# Patient Record
Sex: FEMALE | Employment: FULL TIME | URBAN - METROPOLITAN AREA
[De-identification: names, ages, dates, MRNs, and addresses within clinical notes are randomized per-mention and may not be internally consistent; named-entity substitution may affect disease eponyms.]

---

## 2019-11-04 ENCOUNTER — OFFICE VISIT (OUTPATIENT)
Dept: PODIATRY | Facility: CLINIC | Age: 34
End: 2019-11-04
Payer: COMMERCIAL

## 2019-11-04 VITALS
SYSTOLIC BLOOD PRESSURE: 147 MMHG | DIASTOLIC BLOOD PRESSURE: 83 MMHG | HEIGHT: 65 IN | OXYGEN SATURATION: 95 % | BODY MASS INDEX: 58.08 KG/M2 | HEART RATE: 109 BPM

## 2019-11-04 DIAGNOSIS — B07.0 PLANTAR WART: Primary | ICD-10-CM

## 2019-11-04 PROCEDURE — 17110 DESTRUCTION B9 LES UP TO 14: CPT | Performed by: PODIATRIST

## 2019-11-04 RX ORDER — HYDROCHLOROTHIAZIDE 25 MG/1
25 TABLET ORAL DAILY
Refills: 1 | COMMUNITY
Start: 2019-10-01

## 2019-11-04 RX ORDER — POTASSIUM CHLORIDE 1500 MG/1
20 TABLET, EXTENDED RELEASE ORAL 2 TIMES DAILY
COMMUNITY
Start: 2018-12-24 | End: 2019-12-24

## 2019-11-04 ASSESSMENT — PAIN SCALES - GENERAL: PAINLEVEL: MILD PAIN (3)

## 2019-11-04 NOTE — PATIENT INSTRUCTIONS
Thanks for coming today.  [facility]  [address] [place] [address]    To schedule future appointments in Ortho Clinic, you may call [phone].    To schedule ordered imaging by your provider:   Call Central [facility] [phone]    To schedule an injection ordered by your provider:  Call [facility] Injection scheduling line: [phone]  [facility] available online at:  [email or web address]    Please call if any further questions or concerns ([phone]).  Clinic hours 8 am to 5 pm.    Return to clinic (call) if symptoms worsen or fail to improve.

## 2019-11-04 NOTE — LETTER
11/4/2019         RE: Maryanne Finch  [address] [place] [address]        Dear Colleague,    Thank you for referring your patient, Maryanne Finch, to the [facility]. Please see a copy of my visit note below.    Past Medical History:   Diagnosis Date     Anxiety      Asthma      Depression      Eczema      PONV (postoperative nausea and vomiting)      Seasonal allergies      Patient Active Problem List   Diagnosis     CARDIOVASCULAR SCREENING; LDL GOAL LESS THAN 160     IUD (intrauterine device) in place     Asthma, moderate persistent     Morbid obesity (H)     Mild major depression (H)     Generalized anxiety disorder     Hypertension goal BP (blood pressure) < 140/90     Pedal edema     Past Surgical History:   Procedure Laterality Date     C RAD RESEC TONSIL/PILLARS      age 13     Social History     Socioeconomic History     Marital status: Single     Spouse name: Not on file     Number of children: Not on file     Years of education: Not on file     Highest education level: Not on file   Occupational History     Not on file   Social Needs     Financial resource strain: Not on file     Food insecurity:     Worry: Not on file     Inability: Not on file     Transportation needs:     Medical: Not on file     Non-medical: Not on file   Tobacco Use     Smoking status: Former Smoker     Types: Cigarettes     Last attempt to quit: 5/20/2009     Years since quitting: 10.4     Smokeless tobacco: Never Used   Substance and Sexual Activity     Alcohol use: Yes     Alcohol/week: 1.7 - 5.0 standard drinks     Types: 2 - 6 drink(s) per week     Drug use: No     Sexual activity: Not Currently   Lifestyle     Physical activity:     Days per week: Not on file     Minutes per session: Not on file     Stress: Not on file   Relationships     Social connections:     Talks on phone: Not on file     Gets together: Not on file     Attends Islam service: Not on file     Active member of club or  organization: Not on file     Attends meetings of clubs or organizations: Not on file     Relationship status: Not on file     Intimate partner violence:     Fear of current or ex partner: Not on file     Emotionally abused: Not on file     Physically abused: Not on file     Forced sexual activity: Not on file   Other Topics Concern     Parent/sibling w/ CABG, MI or angioplasty before 65F 55M? Not Asked   Social History Narrative     Not on file     Family History   Problem Relation Age of Onset     Hypertension Mother      Lipids Mother      Hypertension Father      Lipids Father      Cancer Maternal Grandmother         liver, skin     Cancer Maternal Grandfather         colon, lung      Cancer Paternal Grandfather         lung     Diabetes Maternal Grandmother      Diabetes Maternal Aunt      Diabetes Maternal Aunt      Cerebrovascular Disease Father      Cardiovascular Mother         atrial fibrillation      SUBJECTIVE FINDINGS:  A 34-year-old female presents for left foot.  She relates in July, she stepped on a belt buckle.  It went in and she pulled it out.  She got the whole buckle out.  There was nothing left in the wound.  She relates it healed up, it did well.  She put alcohol on it and Neosporin on it and healed up until last week, when it started turning again.  She relates no specific injuries then.  No specific relieving or aggravating factors.  She does work on her feet.      OBJECTIVE FINDINGS:  Plantar lateral left foot:  She has hyperkeratotic tissue buildup that is nucleated with pinpoint ecchymosis, pinpoint bleeding upon debridement.  There is no erythema, no drainage, no odor, no calor.  She has some pain there, but she relates it is much better than it was yesterday.      ASSESSMENT AND PLAN:  Plantar wart, left plantar lateral foot.  This is also an area of previous injury.  Rule out foreign body.  Diagnosis and treatment options discussed with the patient.  We are going to hold off on x-ray  today.  The lesion was debrided and frozen with liquid nitrogen upon consent.  Moleskin was dispensed and use discussed with her.  Return to clinic and see me in 2 weeks.  Diagnosis and treatment options discussed with the patient.         Again, thank you for allowing me to participate in the care of your patient.        Sincerely,        Martin Snyder DPM

## 2019-11-04 NOTE — NURSING NOTE
"Maryanne Finch's chief complaint for this visit includes:  Chief Complaint   Patient presents with     WOUND CARE     stepped on a belt buckle 3 months ago - healed and then 1 week ago pain started again     PCP: Margarita Donaldson    Referring Provider:  No referring provider defined for this encounter.    BP (!) 147/83 (BP Location: Left arm, Patient Position: Sitting, Cuff Size: Adult Regular)   Pulse 109   Ht 1.651 m (5' 5\")   SpO2 95%   BMI 58.08 kg/m      Do you need any medication refills at today's visit? No    Nesha Velazquez CMA        "

## 2019-11-04 NOTE — PROGRESS NOTES
Past Medical History:   Diagnosis Date     Anxiety      Asthma      Depression      Eczema      PONV (postoperative nausea and vomiting)      Seasonal allergies      Patient Active Problem List   Diagnosis     CARDIOVASCULAR SCREENING; LDL GOAL LESS THAN 160     IUD (intrauterine device) in place     Asthma, moderate persistent     Morbid obesity (H)     Mild major depression (H)     Generalized anxiety disorder     Hypertension goal BP (blood pressure) < 140/90     Pedal edema     Past Surgical History:   Procedure Laterality Date     C RAD RESEC TONSIL/PILLARS      age 13     Social History     Socioeconomic History     Marital status: Single     Spouse name: Not on file     Number of children: Not on file     Years of education: Not on file     Highest education level: Not on file   Occupational History     Not on file   Social Needs     Financial resource strain: Not on file     Food insecurity:     Worry: Not on file     Inability: Not on file     Transportation needs:     Medical: Not on file     Non-medical: Not on file   Tobacco Use     Smoking status: Former Smoker     Types: Cigarettes     Last attempt to quit: 5/20/2009     Years since quitting: 10.4     Smokeless tobacco: Never Used   Substance and Sexual Activity     Alcohol use: Yes     Alcohol/week: 1.7 - 5.0 standard drinks     Types: 2 - 6 drink(s) per week     Drug use: No     Sexual activity: Not Currently   Lifestyle     Physical activity:     Days per week: Not on file     Minutes per session: Not on file     Stress: Not on file   Relationships     Social connections:     Talks on phone: Not on file     Gets together: Not on file     Attends [employer] service: Not on file     Active member of club or organization: Not on file     Attends meetings of clubs or organizations: Not on file     Relationship status: Not on file     Intimate partner violence:     Fear of current or ex partner: Not on file     Emotionally abused: Not on file      Physically abused: Not on file     Forced sexual activity: Not on file   Other Topics Concern     Parent/sibling w/ CABG, MI or angioplasty before 65F 55M? Not Asked   Social History Narrative     Not on file     Family History   Problem Relation Age of Onset     Hypertension Mother      Lipids Mother      Hypertension Father      Lipids Father      Cancer Maternal Grandmother         liver, skin     Cancer Maternal Grandfather         colon, lung      Cancer Paternal Grandfather         lung     Diabetes Maternal Grandmother      Diabetes Maternal Aunt      Diabetes Maternal Aunt      Cerebrovascular Disease Father      Cardiovascular Mother         atrial fibrillation      SUBJECTIVE FINDINGS:  A 34-year-old female presents for left foot.  She relates in July, she stepped on a belt buckle.  It went in and she pulled it out.  She got the whole buckle out.  There was nothing left in the wound.  She relates it healed up, it did well.  She put alcohol on it and Neosporin on it and healed up until last week, when it started turning again.  She relates no specific injuries then.  No specific relieving or aggravating factors.  She does work on her feet.      OBJECTIVE FINDINGS:  Plantar lateral left foot:  She has hyperkeratotic tissue buildup that is nucleated with pinpoint ecchymosis, pinpoint bleeding upon debridement.  There is no erythema, no drainage, no odor, no calor.  She has some pain there, but she relates it is much better than it was yesterday.      ASSESSMENT AND PLAN:  Plantar wart, left plantar lateral foot.  This is also an area of previous injury.  Rule out foreign body.  Diagnosis and treatment options discussed with the patient.  We are going to hold off on x-ray today.  The lesion was debrided and frozen with liquid nitrogen upon consent.  Moleskin was dispensed and use discussed with her.  Return to clinic and see me in 2 weeks.  Diagnosis and treatment options discussed with the patient.

## 2019-11-18 ENCOUNTER — OFFICE VISIT (OUTPATIENT)
Dept: PODIATRY | Facility: CLINIC | Age: 34
End: 2019-11-18
Payer: COMMERCIAL

## 2019-11-18 VITALS — HEART RATE: 99 BPM | OXYGEN SATURATION: 95 % | SYSTOLIC BLOOD PRESSURE: 129 MMHG | DIASTOLIC BLOOD PRESSURE: 78 MMHG

## 2019-11-18 DIAGNOSIS — B07.0 PLANTAR WART: Primary | ICD-10-CM

## 2019-11-18 PROCEDURE — 17110 DESTRUCTION B9 LES UP TO 14: CPT | Performed by: PODIATRIST

## 2019-11-18 NOTE — NURSING NOTE
Maryanne Finch's chief complaint for this visit includes:  Chief Complaint   Patient presents with     RECHECK     plantar wart left foot / bilateral foot pain     PCP: Margarita Donaldson    Referring Provider:  No referring provider defined for this encounter.    /78 (BP Location: Left arm, Patient Position: Sitting, Cuff Size: Adult Regular)   Pulse 99   SpO2 95%   Data Unavailable     Do you need any medication refills at today's visit? No    Nesha Velazquez CMA

## 2019-11-18 NOTE — PROGRESS NOTES
Past Medical History:   Diagnosis Date     Anxiety      Asthma      Depression      Eczema      PONV (postoperative nausea and vomiting)      Seasonal allergies      Patient Active Problem List   Diagnosis     CARDIOVASCULAR SCREENING; LDL GOAL LESS THAN 160     IUD (intrauterine device) in place     Asthma, moderate persistent     Morbid obesity (H)     Mild major depression (H)     Generalized anxiety disorder     Hypertension goal BP (blood pressure) < 140/90     Pedal edema     Past Surgical History:   Procedure Laterality Date     C RAD RESEC TONSIL/PILLARS      age 13     Social History     Socioeconomic History     Marital status: Single     Spouse name: Not on file     Number of children: Not on file     Years of education: Not on file     Highest education level: Not on file   Occupational History     Not on file   Social Needs     Financial resource strain: Not on file     Food insecurity:     Worry: Not on file     Inability: Not on file     Transportation needs:     Medical: Not on file     Non-medical: Not on file   Tobacco Use     Smoking status: Former Smoker     Types: Cigarettes     Last attempt to quit: 5/20/2009     Years since quitting: 10.5     Smokeless tobacco: Never Used   Substance and Sexual Activity     Alcohol use: Yes     Alcohol/week: 1.7 - 5.0 standard drinks     Types: 2 - 6 drink(s) per week     Drug use: No     Sexual activity: Not Currently   Lifestyle     Physical activity:     Days per week: Not on file     Minutes per session: Not on file     Stress: Not on file   Relationships     Social connections:     Talks on phone: Not on file     Gets together: Not on file     Attends [employer] service: Not on file     Active member of club or organization: Not on file     Attends meetings of clubs or organizations: Not on file     Relationship status: Not on file     Intimate partner violence:     Fear of current or ex partner: Not on file     Emotionally abused: Not on file      Physically abused: Not on file     Forced sexual activity: Not on file   Other Topics Concern     Parent/sibling w/ CABG, MI or angioplasty before 65F 55M? Not Asked   Social History Narrative     Not on file     Family History   Problem Relation Age of Onset     Hypertension Mother      Lipids Mother      Hypertension Father      Lipids Father      Cancer Maternal Grandmother         liver, skin     Cancer Maternal Grandfather         colon, lung      Cancer Paternal Grandfather         lung     Diabetes Maternal Grandmother      Diabetes Maternal Aunt      Diabetes Maternal Aunt      Cerebrovascular Disease Father      Cardiovascular Mother         atrial fibrillation      SUBJECTIVE FINDINGS:  34-year-old female returns to clinic for plantar wart, left plantar lateral foot, rule out foreign body.  She relates it feels good now.  She rates it was sore for several days after we froze it, but then that got better.  No new problems.      OBJECTIVE FINDINGS:  Plantar lateral left foot, she has hyperkeratotic tissue buildup with minimal pinpoint ecchymosis.  No erythema, no drainage, no odor, no calor.      ASSESSMENT AND PLAN:  Plantar wart, left plantar lateral foot.  Rule out foreign body.  This is doing well.  Diagnosis and treatment options discussed with her.  The lesion was debrided and frozen with liquid nitrogen x3.  Moleskin dispensed and applied upon consent.  The lesion was debrided and frozen upon consent as well.  She will return to clinic to see me in 2 weeks.

## 2019-11-18 NOTE — LETTER
11/18/2019         RE: Maryanne Finch  [address] [place] [address]        Dear Colleague,    Thank you for referring your patient, Maryanne Finch, to the [facility]. Please see a copy of my visit note below.    Past Medical History:   Diagnosis Date     Anxiety      Asthma      Depression      Eczema      PONV (postoperative nausea and vomiting)      Seasonal allergies      Patient Active Problem List   Diagnosis     CARDIOVASCULAR SCREENING; LDL GOAL LESS THAN 160     IUD (intrauterine device) in place     Asthma, moderate persistent     Morbid obesity (H)     Mild major depression (H)     Generalized anxiety disorder     Hypertension goal BP (blood pressure) < 140/90     Pedal edema     Past Surgical History:   Procedure Laterality Date     C RAD RESEC TONSIL/PILLARS      age 13     Social History     Socioeconomic History     Marital status: Single     Spouse name: Not on file     Number of children: Not on file     Years of education: Not on file     Highest education level: Not on file   Occupational History     Not on file   Social Needs     Financial resource strain: Not on file     Food insecurity:     Worry: Not on file     Inability: Not on file     Transportation needs:     Medical: Not on file     Non-medical: Not on file   Tobacco Use     Smoking status: Former Smoker     Types: Cigarettes     Last attempt to quit: 5/20/2009     Years since quitting: 10.5     Smokeless tobacco: Never Used   Substance and Sexual Activity     Alcohol use: Yes     Alcohol/week: 1.7 - 5.0 standard drinks     Types: 2 - 6 drink(s) per week     Drug use: No     Sexual activity: Not Currently   Lifestyle     Physical activity:     Days per week: Not on file     Minutes per session: Not on file     Stress: Not on file   Relationships     Social connections:     Talks on phone: Not on file     Gets together: Not on file     Attends [employer] service: Not on file     Active member of club or  organization: Not on file     Attends meetings of clubs or organizations: Not on file     Relationship status: Not on file     Intimate partner violence:     Fear of current or ex partner: Not on file     Emotionally abused: Not on file     Physically abused: Not on file     Forced sexual activity: Not on file   Other Topics Concern     Parent/sibling w/ CABG, MI or angioplasty before 65F 55M? Not Asked   Social History Narrative     Not on file     Family History   Problem Relation Age of Onset     Hypertension Mother      Lipids Mother      Hypertension Father      Lipids Father      Cancer Maternal Grandmother         liver, skin     Cancer Maternal Grandfather         colon, lung      Cancer Paternal Grandfather         lung     Diabetes Maternal Grandmother      Diabetes Maternal Aunt      Diabetes Maternal Aunt      Cerebrovascular Disease Father      Cardiovascular Mother         atrial fibrillation      SUBJECTIVE FINDINGS:  34-year-old female returns to clinic for plantar wart, left plantar lateral foot, rule out foreign body.  She relates it feels good now.  She rates it was sore for several days after we froze it, but then that got better.  No new problems.      OBJECTIVE FINDINGS:  Plantar lateral left foot, she has hyperkeratotic tissue buildup with minimal pinpoint ecchymosis.  No erythema, no drainage, no odor, no calor.      ASSESSMENT AND PLAN:  Plantar wart, left plantar lateral foot.  Rule out foreign body.  This is doing well.  Diagnosis and treatment options discussed with her.  The lesion was debrided and frozen with liquid nitrogen x3.  Moleskin dispensed and applied upon consent.  The lesion was debrided and frozen upon consent as well.  She will return to clinic to see me in 2 weeks.         Again, thank you for allowing me to participate in the care of your patient.        Sincerely,        Martin Snyder DPM

## 2019-11-18 NOTE — PATIENT INSTRUCTIONS
Thanks for coming today.  Ortho[facility] Medicine Clinic  [address] [place] [address]    To schedule future appointments in Ortho Clinic, you may call [phone].    To schedule ordered imaging by your provider:   Call Central Imaging [phone]    To schedule an injection ordered by your provider:  Call [facility] Injection scheduling line: [phone]  [facility] available online at:  [email or web address]    Please call if any further questions or concerns ([phone]).  Clinic hours 8 am to 5 pm.    Return to clinic (call) if symptoms worsen or fail to improve.

## 2019-12-02 ENCOUNTER — OFFICE VISIT (OUTPATIENT)
Dept: PODIATRY | Facility: CLINIC | Age: 34
End: 2019-12-02
Payer: COMMERCIAL

## 2019-12-02 VITALS — OXYGEN SATURATION: 97 % | DIASTOLIC BLOOD PRESSURE: 93 MMHG | SYSTOLIC BLOOD PRESSURE: 159 MMHG | HEART RATE: 108 BPM

## 2019-12-02 DIAGNOSIS — B07.0 PLANTAR WART: Primary | ICD-10-CM

## 2019-12-02 PROCEDURE — 17110 DESTRUCTION B9 LES UP TO 14: CPT | Performed by: PODIATRIST

## 2019-12-02 NOTE — LETTER
12/2/2019         RE: Maryanne Finch  [address] [place] [address]        Dear Colleague,    Thank you for referring your patient, Maryanne Finch, to the [facility]. Please see a copy of my visit note below.    Past Medical History:   Diagnosis Date     Anxiety      Asthma      Depression      Eczema      PONV (postoperative nausea and vomiting)      Seasonal allergies      Patient Active Problem List   Diagnosis     CARDIOVASCULAR SCREENING; LDL GOAL LESS THAN 160     IUD (intrauterine device) in place     Asthma, moderate persistent     Morbid obesity (H)     Mild major depression (H)     Generalized anxiety disorder     Hypertension goal BP (blood pressure) < 140/90     Pedal edema     Past Surgical History:   Procedure Laterality Date     C RAD RESEC TONSIL/PILLARS      age 13     Social History     Socioeconomic History     Marital status: Single     Spouse name: Not on file     Number of children: Not on file     Years of education: Not on file     Highest education level: Not on file   Occupational History     Not on file   Social Needs     Financial resource strain: Not on file     Food insecurity:     Worry: Not on file     Inability: Not on file     Transportation needs:     Medical: Not on file     Non-medical: Not on file   Tobacco Use     Smoking status: Former Smoker     Types: Cigarettes     Last attempt to quit: 5/20/2009     Years since quitting: 10.5     Smokeless tobacco: Never Used   Substance and Sexual Activity     Alcohol use: Yes     Alcohol/week: 1.7 - 5.0 standard drinks     Types: 2 - 6 drink(s) per week     Drug use: No     Sexual activity: Not Currently   Lifestyle     Physical activity:     Days per week: Not on file     Minutes per session: Not on file     Stress: Not on file   Relationships     Social connections:     Talks on phone: Not on file     Gets together: Not on file     Attends [employer] service: Not on file     Active member of club or  organization: Not on file     Attends meetings of clubs or organizations: Not on file     Relationship status: Not on file     Intimate partner violence:     Fear of current or ex partner: Not on file     Emotionally abused: Not on file     Physically abused: Not on file     Forced sexual activity: Not on file   Other Topics Concern     Parent/sibling w/ CABG, MI or angioplasty before 65F 55M? Not Asked   Social History Narrative     Not on file     Family History   Problem Relation Age of Onset     Hypertension Mother      Lipids Mother      Hypertension Father      Lipids Father      Cancer Maternal Grandmother         liver, skin     Cancer Maternal Grandfather         colon, lung      Cancer Paternal Grandfather         lung     Diabetes Maternal Grandmother      Diabetes Maternal Aunt      Diabetes Maternal Aunt      Cerebrovascular Disease Father      Cardiovascular Mother         atrial fibrillation      SUBJECTIVE FINDINGS:  A 34-year-old female returns to clinic for plantar wart, left plantar lateral foot.  She relates it is doing okay.  It does not hurt.  No new problems.      OBJECTIVE FINDINGS:  Left plantar lateral foot:  She has a hyperkeratotic tissue lesion with some pinpoint ecchymosis that is flattened out.  No erythema, no drainage, no odor, no calor.  Some hyperkeratotic tissue buildup.      ASSESSMENT AND PLAN:  Plantar wart, left plantar lateral foot.  This is improved.  Also rule out foreign body.  Diagnosis and treatment options discussed with the patient.  The lesion was debrided and frozen with liquid nitrogen x3 upon consent.  Moleskin was applied and dispensed upon consent.  She will return to clinic and see me in 2 weeks.  This is improved.         Again, thank you for allowing me to participate in the care of your patient.        Sincerely,        Martin Snyder DPM

## 2019-12-02 NOTE — NURSING NOTE
Maryanne Finch's chief complaint for this visit includes:  Chief Complaint   Patient presents with     RECHECK     plantar wart left foot     PCP: Margarita Donaldson    Referring Provider:  No referring provider defined for this encounter.    BP (!) 159/93 (BP Location: Left arm, Patient Position: Sitting, Cuff Size: Adult Regular)   Pulse 108   SpO2 97%   Data Unavailable     Do you need any medication refills at today's visit? No    Nesha Velazquez CMA

## 2019-12-02 NOTE — PATIENT INSTRUCTIONS
Thanks for coming today.  Ortho[facility] Medicine Clinic  [address] [place] [address]    To schedule future appointments in Ortho Clinic, you may call [phone].    To schedule ordered imaging by your provider:   Call Central Imaging [phone]    To schedule an injection ordered by your provider:  Call Central Imaging Injection scheduling line: [phone]  [facility] available online at:  [email or web address]    Please call if any further questions or concerns ([phone]).  Clinic hours 8 am to 5 pm.    Return to clinic (call) if symptoms worsen or fail to improve.

## 2019-12-02 NOTE — PROGRESS NOTES
Past Medical History:   Diagnosis Date     Anxiety      Asthma      Depression      Eczema      PONV (postoperative nausea and vomiting)      Seasonal allergies      Patient Active Problem List   Diagnosis     CARDIOVASCULAR SCREENING; LDL GOAL LESS THAN 160     IUD (intrauterine device) in place     Asthma, moderate persistent     Morbid obesity (H)     Mild major depression (H)     Generalized anxiety disorder     Hypertension goal BP (blood pressure) < 140/90     Pedal edema     Past Surgical History:   Procedure Laterality Date     C RAD RESEC TONSIL/PILLARS      age 13     Social History     Socioeconomic History     Marital status: Single     Spouse name: Not on file     Number of children: Not on file     Years of education: Not on file     Highest education level: Not on file   Occupational History     Not on file   Social Needs     Financial resource strain: Not on file     Food insecurity:     Worry: Not on file     Inability: Not on file     Transportation needs:     Medical: Not on file     Non-medical: Not on file   Tobacco Use     Smoking status: Former Smoker     Types: Cigarettes     Last attempt to quit: 5/20/2009     Years since quitting: 10.5     Smokeless tobacco: Never Used   Substance and Sexual Activity     Alcohol use: Yes     Alcohol/week: 1.7 - 5.0 standard drinks     Types: 2 - 6 drink(s) per week     Drug use: No     Sexual activity: Not Currently   Lifestyle     Physical activity:     Days per week: Not on file     Minutes per session: Not on file     Stress: Not on file   Relationships     Social connections:     Talks on phone: Not on file     Gets together: Not on file     Attends Hoahaoism service: Not on file     Active member of club or organization: Not on file     Attends meetings of clubs or organizations: Not on file     Relationship status: Not on file     Intimate partner violence:     Fear of current or ex partner: Not on file     Emotionally abused: Not on file      Physically abused: Not on file     Forced sexual activity: Not on file   Other Topics Concern     Parent/sibling w/ CABG, MI or angioplasty before 65F 55M? Not Asked   Social History Narrative     Not on file     Family History   Problem Relation Age of Onset     Hypertension Mother      Lipids Mother      Hypertension Father      Lipids Father      Cancer Maternal Grandmother         liver, skin     Cancer Maternal Grandfather         colon, lung      Cancer Paternal Grandfather         lung     Diabetes Maternal Grandmother      Diabetes Maternal Aunt      Diabetes Maternal Aunt      Cerebrovascular Disease Father      Cardiovascular Mother         atrial fibrillation      SUBJECTIVE FINDINGS:  A 34-year-old female returns to clinic for plantar wart, left plantar lateral foot.  She relates it is doing okay.  It does not hurt.  No new problems.      OBJECTIVE FINDINGS:  Left plantar lateral foot:  She has a hyperkeratotic tissue lesion with some pinpoint ecchymosis that is flattened out.  No erythema, no drainage, no odor, no calor.  Some hyperkeratotic tissue buildup.      ASSESSMENT AND PLAN:  Plantar wart, left plantar lateral foot.  This is improved.  Also rule out foreign body.  Diagnosis and treatment options discussed with the patient.  The lesion was debrided and frozen with liquid nitrogen x3 upon consent.  Moleskin was applied and dispensed upon consent.  She will return to clinic and see me in 2 weeks.  This is improved.

## 2024-02-06 NOTE — TELEPHONE ENCOUNTER
FUTURE VISIT INFORMATION      FUTURE VISIT INFORMATION:  Date: 5/31/24  Time: 8:00am  Location: [facility] [place]  REFERRAL INFORMATION:  Reason for visit/diagnosis  VCD    RECORDS REQUESTED FROM:       Clinic name Comments Records Status Imaging Status   [facility] Allergy OV/referral 12/30/23-11/3/2006 Care Everywhere    [facility] Pulm OV 8/28/23 Care Everywhere    [facility] ED ED visit 1/9/24, 11/23/23 Care Everywhere

## 2024-05-31 ENCOUNTER — PRE VISIT (OUTPATIENT)
Dept: OTOLARYNGOLOGY | Facility: CLINIC | Age: 39
End: 2024-05-31

## 2024-05-31 ENCOUNTER — TELEPHONE (OUTPATIENT)
Dept: OTOLARYNGOLOGY | Facility: CLINIC | Age: 39
End: 2024-05-31
